# Patient Record
Sex: FEMALE | Race: WHITE | HISPANIC OR LATINO | URBAN - METROPOLITAN AREA
[De-identification: names, ages, dates, MRNs, and addresses within clinical notes are randomized per-mention and may not be internally consistent; named-entity substitution may affect disease eponyms.]

---

## 2017-08-18 NOTE — PATIENT DISCUSSION
NONPROLIFERATIVE DIABETIC RETINOPATHY:ENCOURAGE GOOD BLOOD SUGAR AND BLOOD PRESSURE CONTROL. REFER TO Kindra Novak.  RETURN FOR FOLLOW-UP AS SCHEDULED

## 2017-09-05 NOTE — PATIENT DISCUSSION
DIABETIC MACULAR EDEMA, OD:  TX OPTIONS DISCUSSED. PATIENT ELECTED TO CONTINUE OBSERVATION ONLY AT THIS TIME.  TIGHTER BG CONTROL EMPHASIZED. RETURN AS SCHEDULED.

## 2017-09-15 NOTE — PATIENT DISCUSSION
NPDR OU WITH MACULAR EDEMA. CLEARED BY RETINA. WILL NEED NSAID EYEDROPS  BEFORE AND 6-8 WEEKS AFTER PHACO.  NO IMPRIMIS

## 2017-09-15 NOTE — PATIENT DISCUSSION
New Prescription: moxifloxacin (moxifloxacin): drops: 0.5% 1 drop four times a day as directed into affected eye 09-

## 2017-09-15 NOTE — PATIENT DISCUSSION
Surgery Counseling:  I have discussed the option of glasses versus cataract surgery versus following, It was explained that when vision no longer meets the patient's visual needs and a new prescription for glasses is not likely to improve the patient's visual symptoms, the option of cataract surgery is a reasonable next step. It was explained that there is no guarantee that removing the cataract will improve their visual symptoms; however, it is believed that the cataract is contributing to the patient's visual impairment and surgery may noticeably improve both the visual and functional status of the patient. After this discussion, the patient desires to proceed with cataract surgery with implantation of an intraocular lens to improve their vision for DRIVING.

## 2017-09-15 NOTE — PATIENT DISCUSSION
PT UNDERSTANDS VA WILL BE LIMITED BY RETINAL AND OTHER OCULAR PATHOLOGY, AS WELL AS AMBLYOPIA.  INCREASED RISK OF RD DUE TO INCREASED AXIAL LENGTH

## 2017-09-15 NOTE — PATIENT DISCUSSION
New Prescription: Prolensa (bromfenac): drops: 0.07% 1 drop every morning as directed into affected eye 09-

## 2017-09-15 NOTE — PATIENT DISCUSSION
New Prescription: prednisolone acetate (prednisolone acetate): drops,suspension: 1% 1 drop four times a day into affected eye 09-

## 2017-10-13 NOTE — PATIENT DISCUSSION
Continue: prednisolone acetate (prednisolone acetate): drops,suspension: 1% 1 drop four times a day into affected eye 09-

## 2017-10-30 NOTE — PATIENT DISCUSSION
Stopped Today: prednisolone acetate (prednisolone acetate): drops,suspension: 1% 1 drop four times a day into affected eye 09-

## 2017-10-30 NOTE — PATIENT DISCUSSION
S/P CATARACT SURGERY WITH IOL, OU. DOING WELL. CONTINUE DROPS AS DIRECTED. SPECS RX OFFERED. RETURN FOR FOLLOW-UP IN 3 MONTHS FOR DFE OU OR SOONER IF NEEDED.

## 2017-10-30 NOTE — PATIENT DISCUSSION
New Prescription: Prolensa (bromfenac): drops: 0.07% 1 drop once a day as directed into both eyes 10-

## 2018-01-23 NOTE — PATIENT DISCUSSION
MILD TO MODERATE NONPROLIFERATIVE DIABETIC RETINOPATHY OU : ENCOURAGE GOOD BLOOD SUGAR AND BLOOD PRESSURE CONTROL.  RETURN FOR FOLLOW-UP AS SCHEDULED

## 2018-01-23 NOTE — PATIENT DISCUSSION
New Prescription: bromfenac (bromfenac): drops: 0.09% 1 drop twice a day as directed into right eye 01-

## 2018-01-31 NOTE — PATIENT DISCUSSION
New Prescription: Prolensa (bromfenac): drops: 0.07% 1 drop twice a day as directed into right eye 01-

## 2018-01-31 NOTE — PATIENT DISCUSSION
DIABETIC MACULAR EDEMA, OD:  TX OPTIONS DISCUSSED.  PATIENT ELECTS TO CONTINUE TOPICAL THERAPY ONLY WITH BROMFENAC 2X/DAY OD.

## 2018-05-02 NOTE — PATIENT DISCUSSION
DIABETIC MACULAR EDEMA, OD: TX OPTIONS DISCUSSED. DECLINED ANTI-VEGF THERAPY AT THIS TIME. PATIENT ELECTS TO CONTINUE TOPICAL THERAPY ONLY WITH BROMFENAC 2X/DAY OD (PREVIOUSLY USING ONLY 1X/DAY).

## 2018-05-02 NOTE — PATIENT DISCUSSION
New Prescription: bromfenac (bromfenac): drops: 0.09% 1 drop twice a day as directed into right eye 05-

## 2018-08-01 NOTE — PATIENT DISCUSSION
Continue: ketorolac (ketorolac): drops: 0.5% 1 drop four times a day as directed into affected eye 05-

## 2018-08-01 NOTE — PATIENT DISCUSSION
DIABETIC MACULAR EDEMA, OD: TX OPTIONS DISCUSSED. DECLINED ANTI-VEGF THERAPY AT THIS TIME.  PATIENT ELECTS TO CONTINUE TOPICAL THERAPY ONLY WITH KETOROLAC QID OD

## 2018-11-30 NOTE — PATIENT DISCUSSION
DIABETIC MACULAR EDEMA, OU:  VISUALLY SIGNIFICANT. TX OPTIONS DISCUSSED. PATIENT ELECTS TO CONTINUE TO OBSERVE. AGREED TO STOP KETOROLAC EYEDROPS. RETURN AS SCHEDULED.

## 2018-12-24 NOTE — PATIENT DISCUSSION
CLEARED FOR PHACO OU. RETURN TO DR MADRIGAL TO PLAN PHACO. RECOMMEND NSAID EYEDROPS 1 WEEK BEFORE AND 6-8 WEEKS AFTER PHACO. bed rails

## 2019-02-27 NOTE — PATIENT DISCUSSION
DIABETIC MACULAR EDEMA, OU: VISUALLY SIGNIFICANT. ALL TX OPTIONS DISCUSSED. PATIENT ELECTED TO CONTINUE TO OBSERVATION ONLY AT THIS TIME. RETURN AS SCHEDULED.

## 2019-06-05 NOTE — PATIENT DISCUSSION
DIABETIC MACULAR EDEMA, OD: TX OPTIONS DISCUSSED. PATIENT ELECTED TO RETURN AS SCHEDULED TO START AVASTIN (1.25MG) INTRAVITREAL INJECTIONS.
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
## 2019-08-15 NOTE — PATIENT DISCUSSION
SUBCONJUNCTIVAL HEMORRHAGE, OD: NO TREATMENT INDICATED. REASSURANCE GIVEN. ARTIFICIAL TEARS FOR COMFORT UP TO QID.

## 2019-10-16 NOTE — PATIENT DISCUSSION
RETINA IS ATTACHED OU: ASTEROID HYALOSIS OS; NO HOLES OR TEARS SEEN ON DILATED EXAM TODAY.  RETINAL DETACHMENT SIGNS AND SYMPTOMS REVIEWED

## 2019-11-13 NOTE — PATIENT DISCUSSION
DIABETIC MACULAR EDEMA, OD:  OZURDEX INTRAVITREAL IMPLANT TODAY. MODIFIER 22 FOR PROCEDURE REQUIRING INCREASED INTENSITY AND RISKS. RETURN AS SCHEDULED.

## 2019-12-27 NOTE — PATIENT DISCUSSION
Avastin 1.25MG (Office) 1.25 mg / 0.05 ml - OD: Surgeon: Bernadette Alvarado
COUNSELING:
DIABETIC MACULAR EDEMA, OD:  AVASTIN (1.25MG) INTRAVITREAL INJECTION TODAY. RETURN AS SCHEDULED.
Diabetic Macular Edema Counseling:  I have explained to the patient at length the diagnosis of diabetic macular edema and its pathophysiology. I have discussed the various treatment options including medications and surgery. I stressed the importance of management of this condition in order to decrease the risk of permanent damage to the eye. Patient was advised to call if there is any decrease in vision or loss of contrast sensitivity or color vision. Return for follow-up as scheduled.
General:
Intravitreal Injections:
Medications:
New Prescription: timolol maleate (timolol maleate): drops: 0.5% 1 drop twice a day into right eye 12-
OHTN (Treatment) Counseling:  I have explained to the patient at length the diagnosis of ocular hypertension and its pathophysiology. I have reviewed the regimen of drops with the patient and stressed the importance of compliance with treatment and follow-up appointments. Return for follow-up as scheduled.
OHTN, OD:  ELEVATED INTRAOCULAR PRESSURE WITHOUT OPTIC NERVE CHANGES. PRESCRIBED TIMOLOL OD 2X/DAY. RETURN FOR FOLLOW-UP AS SCHEDULED.
Post-injection Counseling: Patients treated with intravitreal injection may notice eye irritation or  burning for 12-24 hours after injection. Some patients may have a small patch of hemorrhage under the skin of the eye, which will fade in a few days. Worsening pain, decreasing vision, or new floaters should prompt you to call the office.
The patient is a 76y Female complaining of abnormal lab result.

## 2022-06-13 ENCOUNTER — NEW PATIENT (OUTPATIENT)
Dept: URBAN - METROPOLITAN AREA CLINIC 29 | Facility: CLINIC | Age: 67
End: 2022-06-13

## 2022-06-13 DIAGNOSIS — H25.813: ICD-10-CM

## 2022-06-13 PROCEDURE — 92004 COMPRE OPH EXAM NEW PT 1/>: CPT

## 2022-06-13 ASSESSMENT — VISUAL ACUITY
OD_CC: 20/30
OS_GLARE: 20/60
OS_CC: 20/30
OS_CC: J1+
OD_CC: J1+
OD_GLARE: 20/60

## 2022-06-13 ASSESSMENT — TONOMETRY
OD_IOP_MMHG: 15
OS_IOP_MMHG: 16

## 2022-07-09 ENCOUNTER — TELEPHONE ENCOUNTER (OUTPATIENT)
Dept: URBAN - METROPOLITAN AREA CLINIC 121 | Facility: CLINIC | Age: 67
End: 2022-07-09

## 2022-07-10 ENCOUNTER — TELEPHONE ENCOUNTER (OUTPATIENT)
Dept: URBAN - METROPOLITAN AREA CLINIC 121 | Facility: CLINIC | Age: 67
End: 2022-07-10

## 2022-07-30 ENCOUNTER — TELEPHONE ENCOUNTER (OUTPATIENT)
Age: 67
End: 2022-07-30

## 2022-07-30 RX ORDER — ALUMINUM HYDROXIDE AND MAGNESIUM CARBONATE 95; 358 MG/15ML; MG/15ML
15ML LIQUID ORAL
Qty: 0 | Refills: 2 | OUTPATIENT
Start: 2019-02-08 | End: 2019-03-10

## 2022-07-30 RX ORDER — PEPPERMINT OIL 90 MG
1-2 CAPSULE, DELAYED, AND EXTENDED RELEASE ORAL
Qty: 0 | Refills: 2 | OUTPATIENT
Start: 2017-03-03 | End: 2017-03-06

## 2022-07-30 RX ORDER — LORATADINE 5 MG
17 GRAMS TABLET,CHEWABLE ORAL
Qty: 0 | Refills: 16 | OUTPATIENT
Start: 2017-03-31 | End: 2019-02-08

## 2022-07-30 RX ORDER — ALUMINUM HYDROXIDE AND MAGNESIUM CARBONATE 95; 358 MG/15ML; MG/15ML
15ML LIQUID ORAL
Qty: 0 | Refills: 2 | OUTPATIENT
Start: 2017-03-03 | End: 2017-04-02

## 2022-07-30 RX ORDER — FAMOTIDINE 10 MG
1 (ONE) TABLET ORAL
Qty: 0 | Refills: 2 | OUTPATIENT
Start: 2017-03-31 | End: 2017-04-30

## 2022-07-30 RX ORDER — ESOMEPRAZOLE MAGNESIUM 20 MG
1 CAPSULE,DELAYED RELEASE (ENTERIC COATED) ORAL DAILY
Qty: 0 | Refills: 2 | OUTPATIENT
Start: 2017-02-08 | End: 2017-02-08

## 2022-07-30 RX ORDER — BIFIDOBACTERIUM LONGUM 10MM CELL
1 (ONE) CAPSULE ORAL
Qty: 0 | Refills: 16 | OUTPATIENT
Start: 2017-03-31 | End: 2019-02-08

## 2022-07-30 RX ORDER — DICYCLOMINE HYDROCHLORIDE 10 MG/1
1 (ONE) CAPSULE ORAL
Qty: 0 | Refills: 16 | OUTPATIENT
Start: 2017-03-31 | End: 2019-02-08

## 2022-07-31 ENCOUNTER — TELEPHONE ENCOUNTER (OUTPATIENT)
Age: 67
End: 2022-07-31

## 2022-07-31 RX ORDER — ALUMINUM HYDROXIDE AND MAGNESIUM CARBONATE 95; 358 MG/15ML; MG/15ML
15ML LIQUID ORAL
Qty: 0 | Refills: 2 | Status: ACTIVE | COMMUNITY
Start: 2019-02-08

## 2022-07-31 RX ORDER — FAMOTIDINE 10 MG
1 (ONE) TABLET ORAL
Qty: 0 | Refills: 2 | Status: ACTIVE | COMMUNITY
Start: 2017-03-31

## 2022-07-31 RX ORDER — FAMOTIDINE 10 MG
1 (ONE) TABLET ORAL
Qty: 0 | Refills: 2 | Status: ACTIVE | COMMUNITY
Start: 2017-03-03

## 2022-07-31 RX ORDER — PEPPERMINT OIL 90 MG
1-2 CAPSULE, DELAYED, AND EXTENDED RELEASE ORAL
Qty: 0 | Refills: 2 | Status: ACTIVE | COMMUNITY
Start: 2017-03-03

## 2022-07-31 RX ORDER — ALUMINUM HYDROXIDE AND MAGNESIUM CARBONATE 95; 358 MG/15ML; MG/15ML
15ML SUSPENSION LIQUID ORAL
Qty: 0 | Refills: 2 | Status: ACTIVE | COMMUNITY
Start: 2017-02-08

## 2022-07-31 RX ORDER — BIFIDOBACTERIUM LONGUM 10MM CELL
1 (ONE) CAPSULE ORAL
Qty: 0 | Refills: 16 | Status: ACTIVE | COMMUNITY
Start: 2017-03-31

## 2022-07-31 RX ORDER — LORATADINE 5 MG
17 GRAMS POWDER TABLET,CHEWABLE ORAL
Qty: 0 | Refills: 16 | Status: ACTIVE | COMMUNITY
Start: 2017-02-08

## 2022-07-31 RX ORDER — LORATADINE 5 MG
17 GRAMS TABLET,CHEWABLE ORAL
Qty: 0 | Refills: 16 | Status: ACTIVE | COMMUNITY
Start: 2017-03-03

## 2022-07-31 RX ORDER — LORATADINE 5 MG
17 GRAMS TABLET,CHEWABLE ORAL
Qty: 0 | Refills: 16 | Status: ACTIVE | COMMUNITY
Start: 2017-03-31

## 2022-07-31 RX ORDER — ALUMINUM HYDROXIDE AND MAGNESIUM CARBONATE 95; 358 MG/15ML; MG/15ML
15ML LIQUID ORAL
Qty: 0 | Refills: 2 | Status: ACTIVE | COMMUNITY
Start: 2017-03-03

## 2022-07-31 RX ORDER — DICYCLOMINE HYDROCHLORIDE 10 MG/1
1 (ONE) CAPSULE ORAL
Qty: 0 | Refills: 16 | Status: ACTIVE | COMMUNITY
Start: 2017-03-03

## 2022-07-31 RX ORDER — BIFIDOBACTERIUM LONGUM 10MM CELL
1 (ONE) CAPSULE ORAL
Qty: 0 | Refills: 16 | Status: ACTIVE | COMMUNITY
Start: 2017-03-03

## 2022-07-31 RX ORDER — DICYCLOMINE HYDROCHLORIDE 10 MG/1
1 (ONE) CAPSULE ORAL
Qty: 0 | Refills: 16 | Status: ACTIVE | COMMUNITY
Start: 2017-03-31

## 2022-07-31 RX ORDER — FAMOTIDINE 20 MG/1
1 (ONE) TABLET ORAL
Qty: 0 | Refills: 16 | Status: ACTIVE | COMMUNITY
Start: 2019-10-02

## 2022-07-31 RX ORDER — FAMOTIDINE 10 MG
1 (ONE) TABLET ORAL
Qty: 0 | Refills: 2 | Status: ACTIVE | COMMUNITY
Start: 2017-02-08